# Patient Record
Sex: FEMALE | Race: WHITE | Employment: FULL TIME | URBAN - METROPOLITAN AREA
[De-identification: names, ages, dates, MRNs, and addresses within clinical notes are randomized per-mention and may not be internally consistent; named-entity substitution may affect disease eponyms.]

---

## 2017-08-07 ENCOUNTER — TELEPHONE (OUTPATIENT)
Dept: OBGYN CLINIC | Facility: CLINIC | Age: 52
End: 2017-08-07

## 2017-09-16 ENCOUNTER — OFFICE VISIT (OUTPATIENT)
Dept: OBGYN CLINIC | Facility: CLINIC | Age: 52
End: 2017-09-16

## 2017-09-16 VITALS
SYSTOLIC BLOOD PRESSURE: 112 MMHG | BODY MASS INDEX: 20.16 KG/M2 | HEIGHT: 65 IN | WEIGHT: 121 LBS | DIASTOLIC BLOOD PRESSURE: 62 MMHG | TEMPERATURE: 98 F | RESPIRATION RATE: 11 BRPM | HEART RATE: 70 BPM

## 2017-09-16 DIAGNOSIS — Z01.419 ENCOUNTER FOR ANNUAL ROUTINE GYNECOLOGICAL EXAMINATION: Primary | ICD-10-CM

## 2017-09-16 DIAGNOSIS — Z11.51 SCREENING FOR HUMAN PAPILLOMAVIRUS: ICD-10-CM

## 2017-09-16 DIAGNOSIS — Z12.39 SCREENING FOR BREAST CANCER: ICD-10-CM

## 2017-09-16 PROCEDURE — 87624 HPV HI-RISK TYP POOLED RSLT: CPT | Performed by: OBSTETRICS & GYNECOLOGY

## 2017-09-16 PROCEDURE — 88175 CYTOPATH C/V AUTO FLUID REDO: CPT | Performed by: OBSTETRICS & GYNECOLOGY

## 2017-09-16 PROCEDURE — 99396 PREV VISIT EST AGE 40-64: CPT | Performed by: OBSTETRICS & GYNECOLOGY

## 2017-09-16 RX ORDER — MELATONIN
325
COMMUNITY

## 2017-09-16 NOTE — PROGRESS NOTES
HPI:   Fabrizio Russell is a 54year old  who presents for an annual gynecological exam.   Menses: Patient's last menstrual period was 2017 (exact date).  Menopause: perimenopausal. Skipping at times but flow is normal and light    passed a symptoms    EXAM:     VITALS: /62 (BP Location: Right arm, Patient Position: Sitting, Cuff Size: adult)   Pulse 70   Temp 98 °F (36.7 °C) (Oral)   Resp 11   Ht 65\"   Wt 121 lb   LMP 08/11/2017 (Exact Date)   Breastfeeding?  No   BMI 20.14 kg/m²   GEN encouraged pt to be compliant with her yearly screening mammograms to maintain breast health. · I encouraged pt being compliant with screening colonoscopy at the age of 50.   · I encouraged baseline Dexa if menopausal.  · I encouraged pt to have yearly graham

## 2017-09-17 LAB — HPV I/H RISK 1 DNA SPEC QL NAA+PROBE: NEGATIVE

## 2017-10-21 ENCOUNTER — HOSPITAL ENCOUNTER (OUTPATIENT)
Dept: MAMMOGRAPHY | Age: 52
Discharge: HOME | End: 2017-10-21
Attending: OBSTETRICS & GYNECOLOGY
Payer: COMMERCIAL

## 2017-10-21 DIAGNOSIS — Z12.39 SCREENING FOR BREAST CANCER: ICD-10-CM

## 2017-10-21 PROCEDURE — 77067 SCR MAMMO BI INCL CAD: CPT | Performed by: OBSTETRICS & GYNECOLOGY

## 2018-07-18 ENCOUNTER — CHARTING TRANS (OUTPATIENT)
Dept: OTHER | Age: 53
End: 2018-07-18

## 2018-07-18 ASSESSMENT — PAIN SCALES - GENERAL: PAINLEVEL_OUTOF10: 0

## 2018-10-29 ENCOUNTER — OFFICE VISIT (OUTPATIENT)
Dept: OBGYN CLINIC | Facility: CLINIC | Age: 53
End: 2018-10-29
Payer: COMMERCIAL

## 2018-10-29 VITALS
RESPIRATION RATE: 12 BRPM | TEMPERATURE: 98 F | HEART RATE: 60 BPM | WEIGHT: 127 LBS | SYSTOLIC BLOOD PRESSURE: 90 MMHG | BODY MASS INDEX: 20.41 KG/M2 | DIASTOLIC BLOOD PRESSURE: 60 MMHG | HEIGHT: 66 IN

## 2018-10-29 DIAGNOSIS — R10.2 PELVIC PAIN: ICD-10-CM

## 2018-10-29 DIAGNOSIS — Z12.4 SCREENING FOR MALIGNANT NEOPLASM OF CERVIX: ICD-10-CM

## 2018-10-29 DIAGNOSIS — Z01.419 ENCOUNTER FOR ANNUAL ROUTINE GYNECOLOGICAL EXAMINATION: Primary | ICD-10-CM

## 2018-10-29 DIAGNOSIS — Z11.51 SCREENING FOR HUMAN PAPILLOMAVIRUS: ICD-10-CM

## 2018-10-29 DIAGNOSIS — Z12.39 BREAST CANCER SCREENING: ICD-10-CM

## 2018-10-29 PROCEDURE — 87624 HPV HI-RISK TYP POOLED RSLT: CPT | Performed by: OBSTETRICS & GYNECOLOGY

## 2018-10-29 PROCEDURE — 99396 PREV VISIT EST AGE 40-64: CPT | Performed by: OBSTETRICS & GYNECOLOGY

## 2018-10-29 PROCEDURE — 88175 CYTOPATH C/V AUTO FLUID REDO: CPT | Performed by: OBSTETRICS & GYNECOLOGY

## 2018-10-29 NOTE — PROGRESS NOTES
Pt's last pe and pap was 9/17 with pap being negative. Mammogram was completed in 10/17 and was negative, as well.

## 2018-10-29 NOTE — PROGRESS NOTES
HPI:   Atiya Guzman is a 50year old  who presents for an annual gynecological exam.   Menses: Patient's last menstrual period was 10/12/2018. Menopause: perimenopausal  Cycles every other month.   She feels that the flow is a bit heavier than befor intolerance  ALLERGIC: denies allergy symptoms    EXAM:     VITALS: BP 90/60 (BP Location: Right arm, Patient Position: Sitting, Cuff Size: adult)   Pulse 60   Temp 97.7 °F (36.5 °C) (Oral)   Resp 12   Ht 66\"   Wt 127 lb   LMP 10/12/2018   Breastfeeding? after her menses. · I highly encouraged pt to be compliant with her yearly screening mammograms to maintain breast health. · I encouraged pt being compliant with screening colonoscopy at the age of 50.   · I encouraged baseline Dexa if menopausal.  · I en

## 2018-10-31 ENCOUNTER — APPOINTMENT (OUTPATIENT)
Dept: OBGYN CLINIC | Facility: CLINIC | Age: 53
End: 2018-10-31
Payer: COMMERCIAL

## 2018-11-02 DIAGNOSIS — N83.201 BILATERAL OVARIAN CYSTS: Primary | ICD-10-CM

## 2018-11-02 DIAGNOSIS — N83.202 BILATERAL OVARIAN CYSTS: Primary | ICD-10-CM

## 2018-11-10 ENCOUNTER — HOSPITAL ENCOUNTER (OUTPATIENT)
Dept: MAMMOGRAPHY | Age: 53
Discharge: HOME | End: 2018-11-10
Payer: COMMERCIAL

## 2018-11-10 DIAGNOSIS — Z12.39 BREAST CANCER SCREENING: ICD-10-CM

## 2018-11-10 PROCEDURE — 77067 SCR MAMMO BI INCL CAD: CPT | Performed by: OBSTETRICS & GYNECOLOGY

## 2018-11-10 PROCEDURE — 77063 BREAST TOMOSYNTHESIS BI: CPT | Performed by: OBSTETRICS & GYNECOLOGY

## 2018-12-31 ENCOUNTER — ULTRASOUND ENCOUNTER (OUTPATIENT)
Dept: OBGYN CLINIC | Facility: CLINIC | Age: 53
End: 2018-12-31
Payer: COMMERCIAL

## 2019-03-05 VITALS
TEMPERATURE: 99.7 F | RESPIRATION RATE: 16 BRPM | DIASTOLIC BLOOD PRESSURE: 64 MMHG | OXYGEN SATURATION: 99 % | HEART RATE: 89 BPM | SYSTOLIC BLOOD PRESSURE: 92 MMHG

## 2019-06-16 ENCOUNTER — WALK IN (OUTPATIENT)
Dept: URGENT CARE | Age: 54
End: 2019-06-16

## 2019-06-16 DIAGNOSIS — S61.319A LACERATION OF NAIL BED OF FINGER, INITIAL ENCOUNTER: Primary | ICD-10-CM

## 2019-06-16 RX ORDER — LANOLIN ALCOHOL/MO/W.PET/CERES
325 CREAM (GRAM) TOPICAL
COMMUNITY

## 2019-06-16 ASSESSMENT — PAIN SCALES - GENERAL: PAINLEVEL: 5-6

## 2019-08-16 PROCEDURE — 88305 TISSUE EXAM BY PATHOLOGIST: CPT | Performed by: INTERNAL MEDICINE

## 2019-12-06 ENCOUNTER — OFFICE VISIT (OUTPATIENT)
Dept: OBGYN CLINIC | Facility: CLINIC | Age: 54
End: 2019-12-06
Payer: COMMERCIAL

## 2019-12-06 VITALS
RESPIRATION RATE: 12 BRPM | BODY MASS INDEX: 20.41 KG/M2 | WEIGHT: 127 LBS | HEART RATE: 60 BPM | TEMPERATURE: 98 F | SYSTOLIC BLOOD PRESSURE: 108 MMHG | HEIGHT: 66 IN | DIASTOLIC BLOOD PRESSURE: 70 MMHG

## 2019-12-06 DIAGNOSIS — Z12.39 BREAST CANCER SCREENING: ICD-10-CM

## 2019-12-06 DIAGNOSIS — Z12.4 SCREENING FOR MALIGNANT NEOPLASM OF THE CERVIX: ICD-10-CM

## 2019-12-06 DIAGNOSIS — Z11.51 SCREENING FOR HUMAN PAPILLOMAVIRUS: ICD-10-CM

## 2019-12-06 DIAGNOSIS — Z01.419 ENCOUNTER FOR ANNUAL ROUTINE GYNECOLOGICAL EXAMINATION: Primary | ICD-10-CM

## 2019-12-06 PROCEDURE — 99396 PREV VISIT EST AGE 40-64: CPT | Performed by: OBSTETRICS & GYNECOLOGY

## 2019-12-06 PROCEDURE — 88175 CYTOPATH C/V AUTO FLUID REDO: CPT | Performed by: OBSTETRICS & GYNECOLOGY

## 2019-12-06 PROCEDURE — 87624 HPV HI-RISK TYP POOLED RSLT: CPT | Performed by: OBSTETRICS & GYNECOLOGY

## 2019-12-06 NOTE — PROGRESS NOTES
HPI:   Purnima Christina is a 48year old  who presents for an annual gynecological exam.   Menses: Patient's last menstrual period was 2019 (exact date). Menopause: perimenopausal  Last cycle was very light.   Current Outpatient Medications   Medi good  HEMATOLOGIC: denies history of anemia  ENDOCRINE: denies thyroid history, cold/heat intolerance  ALLERGIC: denies allergy symptoms    EXAM:     VITALS: /70 (BP Location: Right arm, Patient Position: Sitting, Cuff Size: adult)   Pulse 60   Temp monthly self breast exams; pt was told to perform these in the shower; she was instructed to perform these 7-10 days after her menses. · I highly encouraged pt to be compliant with her yearly screening mammograms to maintain breast health.   · I encouraged

## 2019-12-06 NOTE — PROGRESS NOTES
Pt here for pe/pap.   LMP:11/9/19 was spotting/9/20/19 normal period  Last pap:10/29/18  Last mammogram;11/10/18 negative  Birth control:N/A

## 2020-01-04 ENCOUNTER — HOSPITAL ENCOUNTER (OUTPATIENT)
Dept: MAMMOGRAPHY | Age: 55
Discharge: HOME | End: 2020-01-04
Attending: OBSTETRICS & GYNECOLOGY
Payer: COMMERCIAL

## 2020-01-04 DIAGNOSIS — Z12.39 BREAST CANCER SCREENING: ICD-10-CM

## 2020-01-04 PROCEDURE — 77067 SCR MAMMO BI INCL CAD: CPT | Performed by: OBSTETRICS & GYNECOLOGY

## 2020-01-04 PROCEDURE — 77063 BREAST TOMOSYNTHESIS BI: CPT | Performed by: OBSTETRICS & GYNECOLOGY

## 2021-04-09 ENCOUNTER — OFFICE VISIT (OUTPATIENT)
Dept: OBGYN CLINIC | Facility: CLINIC | Age: 56
End: 2021-04-09
Payer: COMMERCIAL

## 2021-04-09 VITALS
BODY MASS INDEX: 20.89 KG/M2 | HEART RATE: 72 BPM | WEIGHT: 130 LBS | RESPIRATION RATE: 12 BRPM | HEIGHT: 66 IN | TEMPERATURE: 98 F | SYSTOLIC BLOOD PRESSURE: 110 MMHG | DIASTOLIC BLOOD PRESSURE: 68 MMHG

## 2021-04-09 DIAGNOSIS — Z11.51 SCREENING FOR HUMAN PAPILLOMAVIRUS: ICD-10-CM

## 2021-04-09 DIAGNOSIS — Z12.4 SCREENING FOR MALIGNANT NEOPLASM OF CERVIX: ICD-10-CM

## 2021-04-09 DIAGNOSIS — N63.0 LUMP OR MASS IN BREAST: ICD-10-CM

## 2021-04-09 DIAGNOSIS — Z01.419 ENCOUNTER FOR ANNUAL ROUTINE GYNECOLOGICAL EXAMINATION: Primary | ICD-10-CM

## 2021-04-09 DIAGNOSIS — Z12.31 BREAST CANCER SCREENING BY MAMMOGRAM: ICD-10-CM

## 2021-04-09 PROCEDURE — 87624 HPV HI-RISK TYP POOLED RSLT: CPT | Performed by: OBSTETRICS & GYNECOLOGY

## 2021-04-09 PROCEDURE — 3008F BODY MASS INDEX DOCD: CPT | Performed by: OBSTETRICS & GYNECOLOGY

## 2021-04-09 PROCEDURE — 3078F DIAST BP <80 MM HG: CPT | Performed by: OBSTETRICS & GYNECOLOGY

## 2021-04-09 PROCEDURE — 88175 CYTOPATH C/V AUTO FLUID REDO: CPT | Performed by: OBSTETRICS & GYNECOLOGY

## 2021-04-09 PROCEDURE — 3074F SYST BP LT 130 MM HG: CPT | Performed by: OBSTETRICS & GYNECOLOGY

## 2021-04-09 PROCEDURE — 99396 PREV VISIT EST AGE 40-64: CPT | Performed by: OBSTETRICS & GYNECOLOGY

## 2021-04-09 NOTE — PROGRESS NOTES
HPI:   Zion De is a 50year old  who presents for an annual gynecological exam.   Menses: Patient's last menstrual period was 2021. Menopause: perimenopausal  Very light spotting every other month.   Noticed a small lump of the right libby denies history of anemia  ENDOCRINE: denies thyroid history, cold/heat intolerance  ALLERGIC: denies allergy symptoms    EXAM:     VITALS: /68   Pulse 72   Temp 97.7 °F (36.5 °C) (Oral)   Resp 12   Ht 66\"   Wt 130 lb (59 kg)   LMP 02/24/2021   [place] perform these in the shower; she was instructed to perform these 7-10 days after her menses. · I highly encouraged pt to be compliant with her yearly screening mammograms to maintain breast health.   · I encouraged pt being compliant with screening colonos

## 2021-04-09 NOTE — PROGRESS NOTES
Pt here for pe/pap.   LMP:2/24/21  Last pap:12/6/19 negative   Last mammogram;  Birth control:Condoms

## 2021-04-15 ENCOUNTER — LAB ENCOUNTER (OUTPATIENT)
Dept: LAB | Age: 56
End: 2021-04-15
Attending: INTERNAL MEDICINE
Payer: COMMERCIAL

## 2021-04-15 DIAGNOSIS — Z01.419 ENCOUNTER FOR ANNUAL ROUTINE GYNECOLOGICAL EXAMINATION: ICD-10-CM

## 2021-04-15 PROCEDURE — 84443 ASSAY THYROID STIM HORMONE: CPT

## 2021-04-15 PROCEDURE — 80061 LIPID PANEL: CPT

## 2021-04-15 PROCEDURE — 36415 COLL VENOUS BLD VENIPUNCTURE: CPT

## 2021-04-15 PROCEDURE — 80053 COMPREHEN METABOLIC PANEL: CPT

## 2021-04-15 PROCEDURE — 82306 VITAMIN D 25 HYDROXY: CPT

## 2021-04-15 PROCEDURE — 85025 COMPLETE CBC W/AUTO DIFF WBC: CPT

## 2021-04-16 ENCOUNTER — HOSPITAL ENCOUNTER (OUTPATIENT)
Dept: ULTRASOUND IMAGING | Age: 56
Discharge: HOME | End: 2021-04-16
Attending: OBSTETRICS & GYNECOLOGY
Payer: COMMERCIAL

## 2021-04-16 ENCOUNTER — HOSPITAL ENCOUNTER (OUTPATIENT)
Dept: MAMMOGRAPHY | Age: 56
Discharge: HOME | End: 2021-04-16
Attending: OBSTETRICS & GYNECOLOGY
Payer: COMMERCIAL

## 2021-04-16 DIAGNOSIS — N63.0 LUMP OR MASS IN BREAST: ICD-10-CM

## 2021-04-16 PROCEDURE — 77066 DX MAMMO INCL CAD BI: CPT | Performed by: OBSTETRICS & GYNECOLOGY

## 2021-04-16 PROCEDURE — 76642 ULTRASOUND BREAST LIMITED: CPT | Performed by: OBSTETRICS & GYNECOLOGY

## 2021-04-16 PROCEDURE — 77062 BREAST TOMOSYNTHESIS BI: CPT | Performed by: OBSTETRICS & GYNECOLOGY

## 2021-04-19 NOTE — PROGRESS NOTES
Jaci Resendiz,   Your pap smear and hpv results are normal. I encourage you to learn more about each test by accessing the great resources available through [employer].  Click on the test name if you would like to see a description of the test. I hope this informati

## 2021-04-19 NOTE — PROGRESS NOTES
Pauline Phillip,   Your mammogram results are normal. I encourage you to learn more about each test by accessing the great resources available through [employer].  Click on the test name if you would like to see a description of the test. I hope this information is us

## 2021-04-19 NOTE — PROGRESS NOTES
Alona Young,   Your blood test results are normal. I encourage you to learn more about each test by accessing the great resources available through [facility].  Click on the test name if you would like to see a description of the test. I hope this information is u

## 2021-05-25 VITALS
HEART RATE: 70 BPM | SYSTOLIC BLOOD PRESSURE: 100 MMHG | DIASTOLIC BLOOD PRESSURE: 60 MMHG | RESPIRATION RATE: 16 BRPM | TEMPERATURE: 98.4 F | OXYGEN SATURATION: 100 %

## (undated) NOTE — LETTER
12/06/19        To Whom This May Concern:      Please be advised Karen Salomon is a patient under my care. She is to have a light weight computer for work. Should you have any questions or concerns, please call my office at the number listed.       Thank yo

## (undated) NOTE — MR AVS SNAPSHOT
After Visit Summary   12/6/2019    Krista Garvin    MRN: [id number]           Visit Information     Date & Time  12/6/2019 10:45 AM Provider  Harrison Cruz MD Department  [facility], [address], [address]  Phone  [phone]      Your Vit Around December 6, 2019   Imaging:   [facility] KERA 2D+3D SCREENING BILAT (PAU=[id number])    Instructions: To schedule an appointment for your radiology test please call [facility] Scheduling at [phone].                   [facility] now offers Vide Saturday - Sunday  10:00 am - 4:00 pm       Conditions needing urgent attention, but are not life-threatening.          Average cost  $120*       EMERGENCY ROOM         Life-threatening emergencies needing immediate intervention   at a hospital emergency ro

## (undated) NOTE — MR AVS SNAPSHOT
After Visit Summary   4/9/2021    Agustin Bell    MRN: [id number]           Visit Information     Date & Time  4/9/2021  3:30 PM Provider  Dennys De La Vega MD Department  [facility] [address], [facility]  Phone  [phone] COLLECTION [[id number] CUSTOM]  4/9/2021 4/9/2022    LIPID PANEL [[id number] CUSTOM]  4/9/2021 (Approximate) 4/9/2022    YEYO DIAGNOSTIC BILATERAL (CPT=77066) [11847 CPT(R)]  4/9/2021 (Approximate) 4/9/2022    THINPREP PAP SMEAR B [[id number] CUSTOM]  4/9/2021 4/9/2 Providers  Treatment for mild illness or injury that does not require immediate attention.  Average cost  $70*   [facility]  Monday – Friday  8:00 am – 8:00 pm   Saturday – Sunday  8:00 am – 4:00 pm    WALK-IN CARE  Pr